# Patient Record
(demographics unavailable — no encounter records)

---

## 2024-11-05 NOTE — PHYSICAL EXAM
[Normal] : moves all extremities, no focal deficits, normal speech [de-identified] : No carotid bruits auscultated bilaterally

## 2024-11-05 NOTE — DISCUSSION/SUMMARY
[FreeTextEntry1] : 1. Dyspnea: largerly resolved. Echocardiogram demonstrated normal LV systolic function, no significant valvular disease and normal PASP, 11/3/2022. ETT on 4/18/2023 revealed no ischemia at HR achieved (on beta blocker therapy) with good exercise capacity. Normal BP response to exercise.   2. HTN: Goal BP less than 130/80. Recommend low salt diet. Recommend continuing labetalol 200mg BID (high risk medication with no signs of toxicity). and losartan/HCTZ 100/25mg daily. Recommend adding amlodipine 5mg nightly.  3. Former Smoker: abdominal aortic duplex revealed no AAA on 11/3/2022.  Follow up in 6 weeks. [EKG obtained to assist in diagnosis and management of assessed problem(s)] : EKG obtained to assist in diagnosis and management of assessed problem(s)

## 2024-11-05 NOTE — CARDIOLOGY SUMMARY
[de-identified] : 11/5/2024, NSR, normal ECG [id number], NSR with possible LAE [de-identified] : 4/18/202, Plain Treadmill Stress Test: Exercised for 7 minutes and 13 seconds utilizing Standard [place] Protocol. No chest pain or abnormal dyspnea. No ECG changes to suggest ischemia at HR achieved (on beta blocker therapy).\par   [de-identified] : 11/3/2022, LV EF 60-65%, trace MR, mild TR, estimated PASP of 23mmHg. [de-identified] : 11/3/2022, Abdominal Aortic Duplex: no AAA

## 2024-11-05 NOTE — REVIEW OF SYSTEMS
[Fever] : no fever [Chills] : no chills [Feeling Fatigued] : feeling fatigued [Joint Pain] : joint pain [Joint Stiffness] : joint stiffness [Negative] : Respiratory [FreeTextEntry7] : see HPI [FreeTextEntry5] : see HPI

## 2024-11-05 NOTE — HISTORY OF PRESENT ILLNESS
[FreeTextEntry1] : Historical Perspective: 68 year old female, former smoker (1ppd x 20+ years) with PMHx of ulcerative colitis (follows with GI and on Humira) presents for evaluation of dyspnea and HTN. Patient states besides GI, she doesn't follow with any other doctors. Her friend took her BP the other day and it was 200mmHg systolic. [place] [facility] [place] Labs and ECG were unremarkable. She was given Labetalol 200mg BID which she has been taking with no issues. Her BP improved a little. She has no chest pain or pressure. No palpitations. She does have dyspnea on mild-moderate exertion. No orthopnea.   There is no history of MI, CVA, CHF, or previous coronary intervention.  Current Health Status: Patient with no chest pain, SOB, or palpitations. No hospitalizations since seeing me last. Remains compliant with her medications and reports no adverse effects. Patient waking up in morning and BP typically >140/90.